# Patient Record
Sex: MALE | Race: WHITE
[De-identification: names, ages, dates, MRNs, and addresses within clinical notes are randomized per-mention and may not be internally consistent; named-entity substitution may affect disease eponyms.]

---

## 2017-01-11 NOTE — EMERGENCY ROOM VISIT NOTE
History


Report prepared by Akua:  Ayden Wright


Under the Supervision of:  Dr. Orion Shea M.D.


First contact with patient:  09:36


Chief Complaint:  URINARY SYMPTOMS


Stated Complaint:  BACK PAIN/NOT URININATING


Nursing Triage Summary:  


pt c/o left flank pain strated on monday while at work now having difficulty 


urinating and cannot move bowels. denies any n/v. father has hx of kidney 


failure





History of Present Illness


The patient is a 46 year old male who presents to the Emergency Room with 

complaints of worsening left sided flank pain that started 2 days ago. The 

patient rates the discomfort as an 8 out of 10 in severity and notes that it is 

radiating up his back. He also notes that he has been urinating less frequently 

than normal and that his urine has been a darker yellow color. The patient 

denies urgency, hematuria, or any recent trauma to flank. He has no history of 

kidney stones.





   Source of History:  patient


   Onset:  2 days ago


   Position:  other (left-flank)


   Symptom Intensity:  8/10 in severity


   Timing:  worsening


   Associated Symptoms:  + back pain (radiation to back), + urinary symptoms (

darker color and less frequency)


Note:


Denies: urinary urgency, hematuria, or any recent trauma.





Review of Systems


All systems have been listed, reviewed, and are negative other than those 

previously mentioned. Please see Additional Medical History Sheet.





Past Medical & Surgical


Surgical Problems:


(1) S/P appendectomy








Family History





Patient reports no known family medical history.





Social History


Smoking Status:  Current Every Day Smoker


Alcohol Use:  none


Drug Use:  none


Marital Status:  


Occupation Status:  employed





Current/Historical Medications


Scheduled


Citalopram Hydrobromide (Celexa), 20 MG PO DAILY


Fluticasone Propionate (Flonase Nasal Spray), 2 SPRAYS URIAH DAILY


Lansoprazole (Prevacid), 30 MG PO DAILY


Metoprolol Tartrate (Metoprolol Tartrate), 25 MG PO BID





Scheduled PRN


Ibuprofen Tab (Motrin), 600 MG PO Q6H PRN for Pain





Allergies


Coded Allergies:  


     Hydrocodone (Verified  Allergy, Unknown, itching, 1/11/17)





Physical Exam


Vital Signs











  Date Time  Temp Pulse Resp B/P Pulse Ox O2 Delivery O2 Flow Rate FiO2


 


1/11/17 11:33  76 20 157/103 100   


 


1/11/17 10:36  70 16 178/110 96 Room Air  


 


1/11/17 08:48 36.9 79 18 197/133 96 Room Air  











Physical Exam


GENERAL: Patient awake, alert, oriented x 3.  Patient follows commands.  

Patient does not appear toxic.  Patient is adequately hydrated and well-

nourished.  


SKIN: No erythema, pallor, cyanosis or rash


HEENT: Normal head, pupils equal, reactive to light and accommodation. Neck: 

Without adenopathy, no neck vein distention.


LUNGS: Clear to auscultation.  No wheezes, no rales, no rhonchi.


HEART:  No murmurs. No gallops. No rubs


ABDOMEN: No masses, no rebound, no hepatomegaly or splenomegaly. Soft and 

nontender


EXTREMITIES: No signs of trauma.  No pedal or pretibial edema.  No calf or 

thigh tenderness. 


BACK: No spinal or paraspinal tenderness. CVA tenderness on left-side. 


NEUROLOGIC: Cranial nerves II-XII within normal limits.  No gross motor sensory 

function deficits.





Medical Decision & Procedures


ER Provider


Diagnostic Interpretation:


CT results are interpretations by the radiologist and per my review. 








CT SCAN OF THE ABDOMEN AND PELVIS WITHOUT CONTRAST





CLINICAL HISTORY: left flank pain    





COMPARISON STUDY:  No previous studies for comparison. 





TECHNIQUE: CT scan of the abdomen and pelvis was performed from the lung bases


to the proximal femurs. Images are reviewed in the axial, sagittal, and coronal


planes. IV contrast was not administered for this examination.





CT DOSE: 1899.67 mGy.cm


FINDINGS:





Lower chest: There are minor bibasilar atelectatic changes.





Liver: There is severe hepatic steatosis. There is focal fatty sparing adjacent


to gallbladder fossa.





Gallbladder: Unremarkable.





Spleen: The spleen is borderline enlarged measuring 13 cm.





Pancreas: Unremarkable.





Adrenal glands: Unremarkable.





Kidneys: No renal, ureteral, or bladder calculi are visualized. There is no


hydronephrosis.





Bowel: There are no transition zones indicate bowel obstruction. There is no


evidence of acute diverticulitis. There are no findings to indicate acute


appendicitis.





Peritoneum: There is no intraperitoneal free air or abdominal ascites.





Vasculature: The abdominal aorta is normal in course and caliber.





Adenopathy: None.





Pelvic viscera: The bladder, and pelvic viscera are unremarkable.





Skeletal structures: There is 2 cm sclerotic lesion within the right iliac bone,


likely representing a bone island.





IMPRESSION:  


1. No renal, ureteral, or bladder calculi identified


2. No evidence of bowel obstruction. No evidence of free air


3. No evidence of acute appendicitis. No evidence of acute diverticulitis


4. Hepatic steatosis. Borderline splenomegaly.











Electronically signed by:  Wilbert Dee M.D.


1/11/2017 10:40 AM





Dictated Date/Time:  1/11/2017 10:36 AM





Laboratory Results


1/11/17 09:39








1/11/17 09:39

















Test


  1/11/17


09:00 1/11/17


09:39


 


Urine Color YELLOW  


 


Urine Appearance CLEAR (CLEAR)  


 


Urine pH 5.0 (4.5-7.5)  


 


Urine Specific Gravity


  1.011


(1.000-1.030) 


 


 


Urine Protein NEG (NEG)  


 


Urine Glucose (UA) NEG (NEG)  


 


Urine Ketones NEG (NEG)  


 


Urine Occult Blood NEG (NEG)  


 


Urine Nitrite NEG (NEG)  


 


Urine Bilirubin NEG (NEG)  


 


Urine Urobilinogen NEG (NEG)  


 


Urine Leukocyte Esterase NEG (NEG)  


 


Urine WBC (Auto) 0 /hpf (0-5)  


 


Urine RBC (Auto) 0-4 /hpf (0-4)  


 


Urine Hyaline Casts (Auto) 0 /lpf (0-5)  


 


Urine Epithelial Cells (Auto) 0-5 /lpf (0-5)  


 


Urine Bacteria (Auto) NEG (NEG)  


 


Red Blood Count


  


  4.95 M/uL


(4.7-6.1)


 


Mean Corpuscular Volume


  


  89.9 fL


()


 


Mean Corpuscular Hemoglobin


  


  32.5 pg


(25-34)


 


Mean Corpuscular Hemoglobin


Concent 


  36.2 g/dl


(32-36)


 


RDW Standard Deviation


  


  41.7 fL


(36.4-46.3)


 


RDW Coefficient of Variation


  


  12.8 %


(11.5-14.5)


 


Mean Platelet Volume


  


  9.8 fL


(7.4-10.4)


 


Anion Gap


  


  11.0 mmol/L


(3-11)


 


Est Creatinine Clear Calc


Drug Dose 


  140.8 ml/min 


 


 


Estimated GFR (


American) 


  119.7 


 


 


Estimated GFR (Non-


American 


  103.3 


 


 


BUN/Creatinine Ratio  10.3 (10-20) 


 


Calcium Level


  


  9.2 mg/dl


(8.5-10.1)





Laboratory results as stated above per my review.





Medications Administered











 Medications


  (Trade)  Dose


 Ordered  Sig/Reinaldo


 Route  Start Time


 Stop Time Status Last Admin


Dose Admin


 


 Ibuprofen


  (Motrin Tab)  600 mg  NOW  STAT


 PO  1/11/17 09:59


 1/11/17 10:04 DC 1/11/17 10:18


600 MG











ED Course


0955: Past medical records reviewed. The patient was evaluated in room C5. A 

complete history and physical examination was performed. 





0959: Ordered Ibuprofen 600 mg PO. 





1145: Upon reevaluation, the patient appeared to have improvement of his 

symptoms. I discussed today's findings with him. He verbalized agreement of the 

treatment plan. The patient was discharged home.





Medical Decision


Differential diagnoses include trauma, kidney stones, UTI, low back pain, 

musculoskeletal injury. 





Multiple labs and imaging were obtained.  The patient has no evidence of a 

kidney stone.  Urinalysis is clean.  The patient does not appear to be 

infected.  The patient appears to have musculoskeletal pain.  He will be 

treated symptomatically with ibuprofen.





Impression





 Primary Impression:  


 Musculoskeletal pain





Scribe Attestation


The scribe's documentation has been prepared under my direction and personally 

reviewed by me in its entirety. I confirm that the note above accurately 

reflects all work, treatment, procedures, and medical decision making performed 

by me.





Departure Information


Dispostion


Home / Self-Care





Prescriptions





Ibuprofen Tab (MOTRIN) 600 Mg Tab


600 MG PO Q6H Y for Pain, #20 TAB


   Prov: Orion Shea M.D.         1/11/17





Referrals


Ivan Bledsoe III, M.D. (PCP)





Forms


HOME CARE DOCUMENTATION FORM,                                                 

               IMPORTANT VISIT INFORMATION





Patient Instructions


A Signature Page, My [facility]





Additional Instructions





600 mg ibuprofen every 6 hours until pain has resolved.


Take ibuprofen with food.


Follow-up with your family physician in one week if pain is not resolved.


Return here sooner if pain is getting worse.


Apply heat intermittently to your flank.





Work Instructions


Specific Date:  1/13/17

## 2017-01-11 NOTE — DIAGNOSTIC IMAGING REPORT
CT SCAN OF THE ABDOMEN AND PELVIS WITHOUT CONTRAST



CLINICAL HISTORY: left flank pain    



COMPARISON STUDY:  No previous studies for comparison. 



TECHNIQUE: CT scan of the abdomen and pelvis was performed from the lung bases

to the proximal femurs. Images are reviewed in the axial, sagittal, and coronal

planes. IV contrast was not administered for this examination.



CT DOSE: 1899.67 mGy.cm

FINDINGS:



Lower chest: There are minor bibasilar atelectatic changes.



Liver: There is severe hepatic steatosis. There is focal fatty sparing adjacent

to gallbladder fossa.



Gallbladder: Unremarkable.



Spleen: The spleen is borderline enlarged measuring 13 cm.



Pancreas: Unremarkable.



Adrenal glands: Unremarkable.



Kidneys: No renal, ureteral, or bladder calculi are visualized. There is no

hydronephrosis.



Bowel: There are no transition zones indicate bowel obstruction. There is no

evidence of acute diverticulitis. There are no findings to indicate acute

appendicitis.



Peritoneum: There is no intraperitoneal free air or abdominal ascites.



Vasculature: The abdominal aorta is normal in course and caliber.



Adenopathy: None.



Pelvic viscera: The bladder, and pelvic viscera are unremarkable.



Skeletal structures: There is 2 cm sclerotic lesion within the right iliac bone,

likely representing a bone island.



IMPRESSION:  

1. No renal, ureteral, or bladder calculi identified

2. No evidence of bowel obstruction. No evidence of free air

3. No evidence of acute appendicitis. No evidence of acute diverticulitis

4. Hepatic steatosis. Borderline splenomegaly.







Electronically signed by:  Wilbert Dee M.D.

1/11/2017 10:40 AM



Dictated Date/Time:  1/11/2017 10:36 AM

## 2018-01-22 ENCOUNTER — HOSPITAL ENCOUNTER (EMERGENCY)
Dept: HOSPITAL 45 - C.EDB | Age: 48
Discharge: HOME | End: 2018-01-22
Payer: COMMERCIAL

## 2018-01-22 VITALS — DIASTOLIC BLOOD PRESSURE: 87 MMHG | SYSTOLIC BLOOD PRESSURE: 140 MMHG

## 2018-01-22 VITALS
HEIGHT: 72.01 IN | WEIGHT: 250.89 LBS | BODY MASS INDEX: 33.98 KG/M2 | HEIGHT: 72.01 IN | BODY MASS INDEX: 33.98 KG/M2 | WEIGHT: 250.89 LBS

## 2018-01-22 VITALS — TEMPERATURE: 98.42 F

## 2018-01-22 VITALS — OXYGEN SATURATION: 96 % | HEART RATE: 72 BPM

## 2018-01-22 DIAGNOSIS — Z79.82: ICD-10-CM

## 2018-01-22 DIAGNOSIS — R51: Primary | ICD-10-CM

## 2018-01-22 DIAGNOSIS — R73.9: ICD-10-CM

## 2018-01-22 DIAGNOSIS — Z79.899: ICD-10-CM

## 2018-01-22 DIAGNOSIS — I10: ICD-10-CM

## 2018-01-22 DIAGNOSIS — F17.200: ICD-10-CM

## 2018-01-22 LAB
BASOPHILS # BLD: 0.04 K/UL (ref 0–0.2)
BASOPHILS NFR BLD: 0.5 %
BUN SERPL-MCNC: 11 MG/DL (ref 7–18)
CALCIUM SERPL-MCNC: 8.7 MG/DL (ref 8.5–10.1)
CO2 SERPL-SCNC: 24 MMOL/L (ref 21–32)
CREAT SERPL-MCNC: 1 MG/DL (ref 0.6–1.4)
EOS ABS #: 0.33 K/UL (ref 0–0.5)
EOSINOPHIL NFR BLD AUTO: 213 K/UL (ref 130–400)
GLUCOSE SERPL-MCNC: 182 MG/DL (ref 70–99)
HCT VFR BLD CALC: 42.8 % (ref 42–52)
HGB BLD-MCNC: 15.8 G/DL (ref 14–18)
IG#: 0.03 K/UL (ref 0–0.02)
IMM GRANULOCYTES NFR BLD AUTO: 39.7 %
LYMPHOCYTES # BLD: 3.09 K/UL (ref 1.2–3.4)
MCH RBC QN AUTO: 33.3 PG (ref 25–34)
MCHC RBC AUTO-ENTMCNC: 36.9 G/DL (ref 32–36)
MCV RBC AUTO: 90.1 FL (ref 80–100)
MONO ABS #: 0.57 K/UL (ref 0.11–0.59)
MONOCYTES NFR BLD: 7.3 %
NEUT ABS #: 3.72 K/UL (ref 1.4–6.5)
NEUTROPHILS # BLD AUTO: 4.2 %
NEUTROPHILS NFR BLD AUTO: 47.9 %
NRBC BLD AUTO-RTO: 0.8 %
NUCLEATED RED BLOOD CELL ABS: 0.06 K/UL (ref 0–0)
PMV BLD AUTO: 9.8 FL (ref 7.4–10.4)
POTASSIUM SERPL-SCNC: 4.2 MMOL/L (ref 3.5–5.1)
RED CELL DISTRIBUTION WIDTH CV: 12.5 % (ref 11.5–14.5)
RED CELL DISTRIBUTION WIDTH SD: 40.9 FL (ref 36.4–46.3)
SODIUM SERPL-SCNC: 135 MMOL/L (ref 136–145)
WBC # BLD AUTO: 7.78 K/UL (ref 4.8–10.8)

## 2018-01-22 NOTE — EMERGENCY ROOM VISIT NOTE
History


First contact with patient:  01:15


Chief Complaint:  HEADACHE


Stated Complaint:  HEADACHE





History of Present Illness


The patient is a 47 year old male who presents to the Emergency Room with 

complaints of severe headache for the past day and a half described as aching, 

ranging in severity 8 out of 10 from the right occipital region radiating 

forward behind the eye.  Patient last had a migraine greater than 5 years ago.  

This feels different.  No recent imaging.  Patient denies chest pain, dyspnea, 

numbness, tingling, vision problems, hearing problems, recent illness, 

localized weakness, abdominal pain, injury.  Patient noticed his blood pressure 

was high today and took extra metoprolol.





Review of Systems


See HPI for pertinent positives & negatives. A total of 10 systems reviewed and 

were otherwise negative.





Past Medical/Surgical History


Surgical Problems:


(1) S/P appendectomy


Hypertension





Family History





Patient reports no known family medical history.





Social History


Smoking Status:  Current Every Day Smoker


Alcohol Use:  none


Drug Use:  none


Marital Status:  


Housing Status:  lives with family


Occupation Status:  employed





Current/Historical Medications


Scheduled


Aspirin (Aspirin Chewable), 81 MG PO DAILY


Citalopram Hydrobromide (Celexa), 20 MG PO DAILY


Fluticasone Propionate (Fluticasone Propionate), 2 SPRAYS URIAH DAILY


Hctz/Losartan (Hyzaar 12.5MG/50MG), 1 TAB PO DAILY


Lansoprazole (Prevacid), 30 MG PO DAILY


Metoprolol Tartrate (Lopressor) (Lopressor), 50 MG PO BID





Physical Exam


Vital Signs











  Date Time  Temp Pulse Resp B/P (MAP) Pulse Ox O2 Delivery O2 Flow Rate FiO2


 


1/22/18 03:44  73 19  97 Room Air  


 


1/22/18 03:30    135/89    


 


1/22/18 03:29  76 17  97   


 


1/22/18 03:14  75 20  98   


 


1/22/18 03:00    134/88    


 


1/22/18 02:59  72 16  98   


 


1/22/18 02:44  70 18  96   


 


1/22/18 02:39  72 19  97 Room Air  


 


1/22/18 02:31    141/94    


 


1/22/18 02:24  73 20  97   


 


1/22/18 02:09  77 18  97   


 


1/22/18 02:00    161/103    


 


1/22/18 01:54  73 17     


 


1/22/18 01:49  75 19     


 


1/22/18 01:47    165/106    


 


1/22/18 01:45  75      


 


1/22/18 01:24    182/104    


 


1/22/18 01:23    182/104    


 


1/22/18 01:10 36.9 78 18 191/129 98 Room Air  











Physical Exam


VITALS: Vitals are noted on the nurse's note and reviewed by myself.  Vital 

signs hypertensive.


GENERAL: Pleasant male who appears in pain, in no acute distress, nondiaphoretic

, well-developed well-nourished.


SKIN: The skin was without rashes, erythema, edema, or bruising.  There is no 

tenting of the skin.  Capillary reflex less than 2 seconds.


HEAD: Normocephalic atraumatic.  


EARS: External auditory canals clear, tympanic membranes pearly gray without 

erythema or effusion bilaterally.


EYES: Pupils equal round and reactive to light and accommodation.  Conjunctivae 

without injection, sclerae without icterus.  Extraocular movements intact.  


NOSE: Patent, turbinates without inflammation or discharge.  No sinus 

tenderness.


MOUTH: Mucous membranes moist.    Pharynx without erythema or exudate.  Uvula 

midline.  Airway patent.  Tongue does not deviate.  


NECK: Supple without nuchal rigidity.  No lymphadenopathy.  No thyromegaly.  

Cervical spine is nontender.  No JVD.


HEART: Regular rate and rhythm  


LUNGS: Clear to auscultation bilaterally without wheezes, rales or rhonchi.  No 

dullness to percussion.  No retractions or accessory muscle use.


ABDOMEN: Positive bowel sounds x 4.  Normal tympanic percussion.  Soft, 

nontender, without masses or organomegaly.  Cardoza sign negative.  No guarding 

or rebound tenderness.


MUSCULOSKELETAL: No muscle atrophy, erythema, or edema noted.   


NEURO: Patient was alert and oriented to person place and time.  Normal 

sensation to light and sharp touch.  No focal neurological deficits.  Cranial 

nerves II-12 grossly intact.  No pronator drift.  Cerebellar exam intact.





Medical Decision & Procedures


Laboratory Results


1/22/18 01:25








Red Blood Count 4.75, Mean Corpuscular Volume 90.1, Mean Corpuscular Hemoglobin 

33.3, Mean Corpuscular Hemoglobin Concent 36.9, Mean Platelet Volume 9.8, 

Neutrophils (%) (Auto) 47.9, Lymphocytes (%) (Auto) 39.7, Monocytes (%) (Auto) 

7.3, Eosinophils (%) (Auto) 4.2, Basophils (%) (Auto) 0.5, Neutrophils # (Auto) 

3.72, Lymphocytes # (Auto) 3.09, Monocytes # (Auto) 0.57, Eosinophils # (Auto) 

0.33, Basophils # (Auto) 0.04





1/22/18 01:25

















Test


  1/22/18


01:25


 


White Blood Count


  7.78 K/uL


(4.8-10.8)


 


Red Blood Count


  4.75 M/uL


(4.7-6.1)


 


Hemoglobin


  15.8 g/dL


(14.0-18.0)


 


Hematocrit 42.8 % (42-52) 


 


Mean Corpuscular Volume


  90.1 fL


()


 


Mean Corpuscular Hemoglobin


  33.3 pg


(25-34)


 


Mean Corpuscular Hemoglobin


Concent 36.9 g/dl


(32-36)


 


Platelet Count


  213 K/uL


(130-400)


 


Mean Platelet Volume


  9.8 fL


(7.4-10.4)


 


Neutrophils (%) (Auto) 47.9 % 


 


Lymphocytes (%) (Auto) 39.7 % 


 


Monocytes (%) (Auto) 7.3 % 


 


Eosinophils (%) (Auto) 4.2 % 


 


Basophils (%) (Auto) 0.5 % 


 


Neutrophils # (Auto)


  3.72 K/uL


(1.4-6.5)


 


Lymphocytes # (Auto)


  3.09 K/uL


(1.2-3.4)


 


Monocytes # (Auto)


  0.57 K/uL


(0.11-0.59)


 


Eosinophils # (Auto)


  0.33 K/uL


(0-0.5)


 


Basophils # (Auto)


  0.04 K/uL


(0-0.2)


 


RDW Standard Deviation


  40.9 fL


(36.4-46.3)


 


RDW Coefficient of Variation


  12.5 %


(11.5-14.5)


 


Immature Granulocyte % (Auto) 0.4 % 


 


Immature Granulocyte # (Auto)


  0.03 K/uL


(0.00-0.02)


 


Nucleated RBC Absolute Count


(auto) 0.06 K/uL


(0-0)


 


Nucleated Red Blood Cells % 0.8 % 


 


Anion Gap


  8.0 mmol/L


(3-11)


 


Est Creatinine Clear Calc


Drug Dose 117.7 ml/min 


 


 


Estimated GFR (


American) 102.7 


 


 


Estimated GFR (Non-


American 88.6 


 


 


BUN/Creatinine Ratio 10.5 (10-20) 


 


Calcium Level


  8.7 mg/dl


(8.5-10.1)


 


Chemistry Specimen Hemolysis  











Medications Administered











 Medications


  (Trade)  Dose


 Ordered  Sig/Reinaldo


 Route  Start Time


 Stop Time Status Last Admin


Dose Admin


 


 Metoclopramide HCl


  (Reglan Inj)  10 mg  NOW  STAT


 IV  1/22/18 01:22


 1/22/18 01:23 DC 1/22/18 01:53


10 MG


 


 Diphenhydramine


 HCl


  (Benadryl Inj)  12.5 mg  NOW  STAT


 IV  1/22/18 01:22


 1/22/18 01:23 DC 1/22/18 01:49


12.5 MG


 


 Dexamethasone


 Sodium Phosphate


  (Dexamethasone


 Inj **Pf**)  10 mg  NOW  ONCE


 IV  1/22/18 02:30


 1/22/18 02:31 DC 1/22/18 02:25


10 MG


 


 Magnesium Sulfate


  (Magnesium


 Sulfate)  2 gm  NOW  STAT


 IV  1/22/18 02:18


 1/22/18 02:19 DC 1/22/18 02:28


2 GM











ED Course


Prior records/ancillary studies reviewed.





Triage Nursing notes reviewed.





The patient's history was concerning for headache.





Differential diagnosis:


Etiologies such as migraine headache, meningitis, sinusitis, CO exposure, ICH, 

SAH, infection, tumor, headache, sinus thrombosis, arterial dissection, as well 

as others were entertained.





Physical examination findings:


As above. Non-focal.





ER treatment provided:


Reglan, Benadryl


On reassessment the patient felt better.





Diagnostics interpreted by me:





The labs revealed stable H&H.  Hyperglycemia without DKA





Imaging studies:


CT HEAD:


COMPARISON: 11/26/2010


IMPRESSION:


No ICH, mass effect, or midline shift. No acute cortical infarct.


Radiologist: Pushpa Paolmo M.D.








This appears to be consistent with headache that could be from occipital 

neuralgia.  Patient was neurovascularly and neurologically intact.  The blood 

pressure did improve.  Patient was advised follow-up family care for screening 

for diabetes and for further evaluation and workup this week.  He was advised 

to return to the ER immediately for headache, fevers, numbness, tingling, 

worsening signs or symptoms or as needed.  Patient was tolerating fluids and 

ambulated out of the ER without difficulties.  Patient states he felt much 

better and wanted to leave.  By the evaluation outlined above emergent 

etiologies such as meningitis, sinusitis, CO exposure, ICH, SAH, infection, 

temporal arteritis, tumor, sinus thrombosis, arterial dissection, as well as 

others were deemed relatively unlikely.





The pt informed about the findings as listed above. All questions were answered 

and  pleased with the treatment. Return instructions were outlined and the 

patient was discharged in stable condition.











Referral:


The patient was referred back to their primary care physician for follow-up in 

2 to 3 days for a recheck of the current condition.





Case reviewed by attending





Medical Decision


As above





Medication Reconcilliation


Current Medication List:  was personally reviewed by me





Blood Pressure Screening


Patient's blood pressure:  Elevated blood pressure


Blood pressure disposition:  Referred to PCP





Impression





 Primary Impression:  


 Headache


 Additional Impression:  


 Hyperglycemia





Departure Information


Dispostion


Home / Self-Care





Condition


GOOD





Referrals


Ivan Bledsoe III, M.D. (PCP)





Patient Instructions


My [facility]





Additional Instructions








DO NOT drive, drink alcohol, operate machinery, or perform dangerous activities 

today.  You were given medications in the ER that can affect your ability to 

safely function or operate a vehicle.





Your blood sugar was high today.  Recheck this with family care for possible 

diabetes.





Rest today in a quiet, peaceful, dark environment and get a full 8-10 hrs of 

sleep tonight.  





Avoid loud noises, smoke/smoking, alcohol, bright lights, stress, or physical 

exertion today to minimize the chance the headache may return.  





Continue current medications.





Ibuprofen(Motrin, Advil) may be used for fever or pain.  Use 600mg every six 

hours as needed.  Take with food.  Avoid using more than 2400mg in a 24 hour 

period.  Do not use 2400mg per day for more than three consecutive days without 

physician direction.  Prolonged inappropriate use can lead to stomach upset or 

ulcers. 


(AND/OR)


Acetaminophen(Tylenol) may be used for fever or pain.  Use 1000mg every six 

hours as needed.  Avoid using more than 3000mg in a 24 hour period.  





Return to the ER for passing out, worsening headache, vision problems, neck 

stiffness/pain, fevers, vomiting, worsening of your condition, or as needed.  





Follow up with your primary physician and/or a neurologist in 2-3 days for a 

recheck of your current condition.





Problem Qualifiers








 Primary Impression:  


 Headache


 Headache type:  unspecified  Headache chronicity pattern:  acute headache  

Intractability:  not intractable  Qualified Codes:  R51 - Headache